# Patient Record
Sex: FEMALE | Race: BLACK OR AFRICAN AMERICAN | Employment: FULL TIME | URBAN - METROPOLITAN AREA
[De-identification: names, ages, dates, MRNs, and addresses within clinical notes are randomized per-mention and may not be internally consistent; named-entity substitution may affect disease eponyms.]

---

## 2020-02-25 ENCOUNTER — HOSPITAL ENCOUNTER (EMERGENCY)
Age: 30
Discharge: HOME | End: 2020-02-25
Payer: COMMERCIAL

## 2020-02-25 ENCOUNTER — APPOINTMENT (OUTPATIENT)
Dept: GENERAL RADIOLOGY | Age: 30
End: 2020-02-25
Payer: COMMERCIAL

## 2020-02-25 LAB
BACTERIA: ABNORMAL /HPF
BILIRUBIN URINE: ABNORMAL
BLOOD, URINE: ABNORMAL
CLARITY: CLEAR
COLOR: YELLOW
EPITHELIAL CELLS, UA: ABNORMAL /HPF (ref 0–5)
GLUCOSE URINE: NEGATIVE MG/DL
HCG(URINE) PREGNANCY TEST: NEGATIVE
KETONES, URINE: 40 MG/DL
LEUKOCYTE ESTERASE, URINE: ABNORMAL
MICROSCOPIC EXAMINATION: YES
NITRITE, URINE: NEGATIVE
PH UA: 8 (ref 5–8)
PROTEIN UA: 30 MG/DL
RAPID INFLUENZA  B AGN: NEGATIVE
RAPID INFLUENZA A AGN: NEGATIVE
RBC UA: ABNORMAL /HPF (ref 0–4)
S PYO AG THROAT QL: NEGATIVE
SPECIFIC GRAVITY UA: 1.02 (ref 1–1.03)
URINE REFLEX TO CULTURE: YES
URINE TYPE: ABNORMAL
UROBILINOGEN, URINE: 2 E.U./DL
WBC UA: ABNORMAL /HPF (ref 0–5)

## 2020-02-25 PROCEDURE — 81001 URINALYSIS AUTO W/SCOPE: CPT

## 2020-02-25 PROCEDURE — 87086 URINE CULTURE/COLONY COUNT: CPT

## 2020-02-25 PROCEDURE — 87804 INFLUENZA ASSAY W/OPTIC: CPT

## 2020-02-25 PROCEDURE — 84703 CHORIONIC GONADOTROPIN ASSAY: CPT

## 2020-02-25 PROCEDURE — 71046 X-RAY EXAM CHEST 2 VIEWS: CPT

## 2020-02-25 PROCEDURE — 87077 CULTURE AEROBIC IDENTIFY: CPT

## 2020-02-25 PROCEDURE — 6370000000 HC RX 637 (ALT 250 FOR IP): Performed by: NURSE PRACTITIONER

## 2020-02-25 PROCEDURE — 87081 CULTURE SCREEN ONLY: CPT

## 2020-02-25 PROCEDURE — 99283 EMERGENCY DEPT VISIT LOW MDM: CPT

## 2020-02-25 PROCEDURE — 87880 STREP A ASSAY W/OPTIC: CPT

## 2020-02-25 RX ORDER — ACETAMINOPHEN 500 MG
1000 TABLET ORAL 4 TIMES DAILY PRN
Qty: 60 TABLET | Refills: 0 | Status: SHIPPED | OUTPATIENT
Start: 2020-02-25

## 2020-02-25 RX ORDER — ONDANSETRON 4 MG/1
4 TABLET, ORALLY DISINTEGRATING ORAL ONCE
Status: DISCONTINUED | OUTPATIENT
Start: 2020-02-25 | End: 2020-02-25 | Stop reason: HOSPADM

## 2020-02-25 RX ORDER — CEFUROXIME AXETIL 250 MG/1
500 TABLET ORAL ONCE
Status: COMPLETED | OUTPATIENT
Start: 2020-02-25 | End: 2020-02-25

## 2020-02-25 RX ORDER — BENZONATATE 100 MG/1
200 CAPSULE ORAL ONCE
Status: COMPLETED | OUTPATIENT
Start: 2020-02-25 | End: 2020-02-25

## 2020-02-25 RX ORDER — IBUPROFEN 400 MG/1
800 TABLET ORAL ONCE
Status: COMPLETED | OUTPATIENT
Start: 2020-02-25 | End: 2020-02-25

## 2020-02-25 RX ORDER — GUAIFENESIN 600 MG/1
600 TABLET, EXTENDED RELEASE ORAL 2 TIMES DAILY
Status: DISCONTINUED | OUTPATIENT
Start: 2020-02-25 | End: 2020-02-25 | Stop reason: HOSPADM

## 2020-02-25 RX ORDER — ONDANSETRON 4 MG/1
4-8 TABLET, ORALLY DISINTEGRATING ORAL EVERY 12 HOURS PRN
Qty: 12 TABLET | Refills: 0 | Status: SHIPPED | OUTPATIENT
Start: 2020-02-25

## 2020-02-25 RX ORDER — NAPROXEN 500 MG/1
500 TABLET ORAL 2 TIMES DAILY
Qty: 30 TABLET | Refills: 0 | Status: SHIPPED | OUTPATIENT
Start: 2020-02-25

## 2020-02-25 RX ORDER — ACETAMINOPHEN 500 MG
1000 TABLET ORAL ONCE
Status: COMPLETED | OUTPATIENT
Start: 2020-02-25 | End: 2020-02-25

## 2020-02-25 RX ORDER — CEFUROXIME AXETIL 250 MG/1
250 TABLET ORAL 2 TIMES DAILY
Qty: 14 TABLET | Refills: 0 | Status: SHIPPED | OUTPATIENT
Start: 2020-02-25 | End: 2020-03-03

## 2020-02-25 RX ORDER — OSELTAMIVIR PHOSPHATE 75 MG/1
75 CAPSULE ORAL 2 TIMES DAILY
Qty: 10 CAPSULE | Refills: 0 | Status: SHIPPED | OUTPATIENT
Start: 2020-02-25 | End: 2020-03-01

## 2020-02-25 RX ORDER — GUAIFENESIN/DEXTROMETHORPHAN 100-10MG/5
5-10 SYRUP ORAL 3 TIMES DAILY PRN
Qty: 120 ML | Refills: 0 | Status: SHIPPED | OUTPATIENT
Start: 2020-02-25 | End: 2020-03-06

## 2020-02-25 RX ADMIN — ACETAMINOPHEN 1000 MG: 500 TABLET ORAL at 19:11

## 2020-02-25 RX ADMIN — BENZONATATE 200 MG: 100 CAPSULE ORAL at 19:11

## 2020-02-25 RX ADMIN — GUAIFENESIN 600 MG: 600 TABLET ORAL at 19:11

## 2020-02-25 RX ADMIN — IBUPROFEN 800 MG: 400 TABLET ORAL at 19:11

## 2020-02-25 RX ADMIN — CEFUROXIME AXETIL 500 MG: 250 TABLET ORAL at 21:14

## 2020-02-25 ASSESSMENT — PAIN SCALES - GENERAL
PAINLEVEL_OUTOF10: 8
PAINLEVEL_OUTOF10: 8

## 2020-02-25 ASSESSMENT — PAIN DESCRIPTION - LOCATION: LOCATION: GENERALIZED

## 2020-02-25 ASSESSMENT — PAIN DESCRIPTION - DESCRIPTORS: DESCRIPTORS: ACHING

## 2020-02-25 ASSESSMENT — PAIN DESCRIPTION - PAIN TYPE: TYPE: ACUTE PAIN

## 2020-02-26 VITALS
DIASTOLIC BLOOD PRESSURE: 85 MMHG | RESPIRATION RATE: 22 BRPM | SYSTOLIC BLOOD PRESSURE: 124 MMHG | HEIGHT: 63 IN | OXYGEN SATURATION: 100 % | TEMPERATURE: 98.6 F | WEIGHT: 153.88 LBS | BODY MASS INDEX: 27.27 KG/M2 | HEART RATE: 99 BPM

## 2020-02-26 LAB — S PYO THROAT QL CULT: NORMAL

## 2020-02-26 NOTE — ED NOTES
VS taken at approx 2000 and not documented.   /86, resp 22,  pulse 110,O2 sat 100% (approximately)      Steven Parker RN  02/26/20 1509

## 2020-02-26 NOTE — ED NOTES
[id number] ready to go home. Discharge instructions with pt. Explained rx's. UTI and influenza teaching with pt.    Encouraged follow up with her PCP  Body aches at 3-4     Luis Craft RN  02/26/20 4168

## 2020-02-26 NOTE — ED NOTES
NP already to bedside to see pt. Up to bathroom for urine specimen.         Carlos Mckeon RN  02/25/20 1906

## 2020-02-26 NOTE — ED NOTES
Explained new orders. Recheck temp 102.8.   No tylenol/motrin since early this am     Keyon Cruz, RN  02/25/20 5753

## 2020-02-26 NOTE — ED PROVIDER NOTES
[address] [address] [address] [facility] [address]  Dept: [phone]  Loc: [address] ENCOUNTER        This patient was not seen or evaluated by the attending physician. I evaluated this patient, the attending physician was available for consultation. CHIEF COMPLAINT    Chief Complaint   Patient presents with    Chest Congestion     starting 2 days ago    Fever     102.4F today    Generalized Body Aches     8/10        HPI    Andi Mckay is a 34 y.o. female who presents with nasal congestion associated with cough and generalized myalgia. Onset was 2 days ago. The duration has been constant since the onset. There are no alleviating factors. The severity of the myalgia pain is 8/10. She denies any abdominal pain nausea vomiting or diarrhea. States that she does have a mild cough that is nonproductive. She has been taking over-the-counter APAP and NSAIDs for fevers and chills. Measured fever at home 102.4 °F.  Denies being diabetic or immunocompromise. States that she came to the ED for further evaluation and treatment. REVIEW OF SYSTEMS    Pulmonary: no sore throat, see HPI, no hemoptysis  General: + fever, + myalgia  GI: no nausea, no vomiting  All other systems reviewed and are negative. PAST MEDICAL AND SURGICAL HISTORY    Past Medical History:   Diagnosis Date    H/O: psoriasis      No past surgical history on file.     CURRENT MEDICATIONS  (may include discharge medications prescribed in the ED)  Current Outpatient Rx   Medication Sig Dispense Refill    acetaminophen (TYLENOL) 500 MG tablet Take 2 tablets by mouth 4 times daily as needed for Pain or Fever 60 tablet 0    naproxen (NAPROSYN) 500 MG tablet Take 1 tablet by mouth 2 times daily 30 tablet 0    cefUROXime (CEFTIN) 250 MG tablet Take 1 tablet by mouth 2 times daily for 7 days 14 tablet 0    ondansetron (ZOFRAN ODT) 4 MG disintegrating on file       PHYSICAL EXAM    VITAL SIGNS: /85   Pulse 99   Temp 99.5 °F (37.5 °C) (Oral)   Resp 22   Ht 5' 2.5\" (1.588 m)   Wt 153 lb 14.1 oz (69.8 kg)   SpO2 100%   BMI 27.70 kg/m²   Constitutional:  Well developed, well nourished, no acute distress  Eyes: Sclera nonicteric, conjunctiva normal   Ears: The bilateral ear canal appears not erythematous or edematous and the TM appears gray and pearlescent nonbulging, the mastoid and pinna are without redness or swelling   Throat/Face:  no exudate or edema of the throat, no trismus  Neck: Supple, no enlarged tonsillar lymphadenopathy  Respiratory:  Lungs clear to auscultation bilaterally, no retractions  Cardiovascular:  tachycardic rate, regular rhythm  GI: soft, nontender, nondistended, no CVA tenderness  Neurologic: Awake, alert and oriented, no slurred speech  Integument: Skin is warm and dry, no rash    RADIOLOGY/PROCEDURES    XR CHEST STANDARD (2 VW)   Final Result   No acute pulmonary disease.            LABS  Results for orders placed or performed during the hospital encounter of 02/25/20   Strep Screen Group A Throat   Result Value Ref Range    Rapid Strep A Screen Negative Negative   Rapid influenza A/B antigens   Result Value Ref Range    Rapid Influenza A Ag Negative Negative    Rapid Influenza B Ag Negative Negative   Pregnancy, Urine   Result Value Ref Range    HCG(Urine) Pregnancy Test Negative Detects HCG level >20 MIU/mL   Urinalysis Reflex to Culture   Result Value Ref Range    Color, UA Yellow Straw/Yellow    Clarity, UA Clear Clear    Glucose, Ur Negative Negative mg/dL    Bilirubin Urine SMALL (A) Negative    Ketones, Urine 40 (A) Negative mg/dL    Specific Gravity, UA 1.020 1.005 - 1.030    Blood, Urine TRACE-INTACT (A) Negative    pH, UA 8.0 5.0 - 8.0    Protein, UA 30 (A) Negative mg/dL    Urobilinogen, Urine 2.0 (A) <2.0 E.U./dL    Nitrite, Urine Negative Negative    Leukocyte Esterase, Urine TRACE (A) Negative    Microscopic followup, and discharge instructions (see EMR)      (Please note that this note was completed with a voice recognition program.  Every attempt was made to edit the dictations, but inevitably there remain words that are mis-transcribed.)              BEULAH Claire CNP  02/25/20 2031

## 2020-02-27 LAB
ORGANISM: ABNORMAL
URINE CULTURE, ROUTINE: ABNORMAL

## 2021-04-16 ENCOUNTER — HOSPITAL ENCOUNTER (EMERGENCY)
Age: 31
Discharge: HOME | End: 2021-04-16
Attending: EMERGENCY MEDICINE

## 2021-04-16 VITALS
TEMPERATURE: 98.6 F | HEART RATE: 92 BPM | DIASTOLIC BLOOD PRESSURE: 97 MMHG | HEIGHT: 62 IN | OXYGEN SATURATION: 97 % | RESPIRATION RATE: 18 BRPM | SYSTOLIC BLOOD PRESSURE: 145 MMHG | BODY MASS INDEX: 28.15 KG/M2

## 2021-04-16 DIAGNOSIS — Z71.1 CONCERN ABOUT DISEASE WITHOUT DIAGNOSIS: Primary | ICD-10-CM

## 2021-04-16 LAB
BACTERIA: ABNORMAL /HPF
BILIRUBIN URINE: NEGATIVE
BLOOD, URINE: ABNORMAL
CLARITY: CLEAR
COLOR: YELLOW
EPITHELIAL CELLS, UA: ABNORMAL /HPF (ref 0–5)
GLUCOSE URINE: NEGATIVE MG/DL
KETONES, URINE: NEGATIVE MG/DL
LEUKOCYTE ESTERASE, URINE: ABNORMAL
MICROSCOPIC EXAMINATION: YES
NITRITE, URINE: NEGATIVE
PH UA: 6.5 (ref 5–8)
PROTEIN UA: NEGATIVE MG/DL
RBC UA: ABNORMAL /HPF (ref 0–4)
SPECIFIC GRAVITY UA: 1.02 (ref 1–1.03)
URINE REFLEX TO CULTURE: ABNORMAL
URINE TYPE: ABNORMAL
UROBILINOGEN, URINE: 0.2 E.U./DL
WBC UA: ABNORMAL /HPF (ref 0–5)

## 2021-04-16 PROCEDURE — 99283 EMERGENCY DEPT VISIT LOW MDM: CPT

## 2021-04-16 PROCEDURE — 81001 URINALYSIS AUTO W/SCOPE: CPT

## 2021-04-16 ASSESSMENT — ENCOUNTER SYMPTOMS
EYES NEGATIVE: 1
RESPIRATORY NEGATIVE: 1
GASTROINTESTINAL NEGATIVE: 1

## 2021-04-17 NOTE — ED NOTES
Patient ambulatory from ED. AVS provided and discussed with patient. All questions answered. Patient verbalizes understanding of discharge instructions. Respirations even and easy. No obvious distress at this time.        Eliane Tamayo RN  04/16/21 2858

## 2021-04-17 NOTE — ED PROVIDER NOTES
[address] [facility] ENCOUNTER        Pt Name: Lorene Buckley  MRN: [id number]  [place] 1990  Date of evaluation: 4/16/2021  Provider: Yulia Champion MD  PCP: Dong Melendez MD            [address] [place] [address]       Chief Complaint   Patient presents with    Abnormal Lab     Patient states she recieved a call that her pH levels were high on a recent UDS and was told she may have a UTI. Patient denies symptoms at this time. HISTORY OF PRESENT ILLNESS   (Location/Symptom, Timing/Onset, Context/Setting, Quality, Duration, Modifying Factors, Severity)  Note limiting factors. Lorene Buckley is a 27 y.o. female who comes in with a complaint that she had abnormal lab results. She took a urine drug screen for a job and they told her that the pH was too high so she came here to get further results. She is completely asymptomatic. No exacerbating or relieving factors no other associated signs or symptoms    Nursing Notes were all reviewed and agreed with or any disagreements were addressed  in the HPI. REVIEW OF SYSTEMS    (2-9 systems for level 4, 10 or more for level 5)     Review of Systems   Constitutional: Negative. HENT: Negative. Eyes: Negative. Respiratory: Negative. Cardiovascular: Negative. Gastrointestinal: Negative. Endocrine: Negative. Genitourinary: Negative. Musculoskeletal: Negative. Skin: Negative. Neurological: Negative. Hematological: Negative. Psychiatric/Behavioral: Negative. PAST MEDICAL HISTORY     Past Medical History:   Diagnosis Date    H/O: psoriasis        SURGICAL HISTORY   History reviewed. No pertinent surgical history.     CURRENTMEDICATIONS       Previous Medications    ACETAMINOPHEN (TYLENOL) 500 MG TABLET    Take 2 tablets by mouth 4 times daily as needed for Pain or Fever    NAPROXEN (NAPROSYN) 500 MG TABLET    Take 1 tablet by mouth 2 times daily    ONDANSETRON [facility] ODT) 4 MG DISINTEGRATING TABLET    Take 1-2 tablets by mouth every 12 hours as needed for Nausea May Sub regular tablet (non-ODT) if insurance does not cover ODT. ALLERGIES     Patient has no known allergies. FAMILYHISTORY     History reviewed. No pertinent family history. SOCIAL HISTORY       Social History     Socioeconomic History    Marital status: Single     Spouse name: None    Number of children: None    Years of education: None    Highest education level: None   Occupational History    None   Social Needs    Financial resource strain: None    Food insecurity     Worry: None     Inability: None    Transportation needs     Medical: None     Non-medical: None   Tobacco Use    Smoking status: Never Smoker    Smokeless tobacco: Never Used   Substance and Sexual Activity    Alcohol use: No    Drug use: No    Sexual activity: Yes     Partners: Male   Lifestyle    Physical activity     Days per week: None     Minutes per session: None    Stress: None   Relationships    Social connections     Talks on phone: None     Gets together: None     Attends Ca[employer] service: None     Active member of club or organization: None     Attends meetings of clubs or organizations: None     Relationship status: None    Intimate partner violence     Fear of current or ex partner: None     Emotionally abused: None     Physically abused: None     Forced sexual activity: None   Other Topics Concern    None   Social History Narrative    None       SCREENINGS             PHYSICAL EXAM    (up to 7 for level 4, 8 or more for level 5)     ED Triage Vitals   BP Temp Temp Source Pulse Resp SpO2 Height Weight   04/16/21 2014 04/16/21 2015 04/16/21 2015 04/16/21 2012 04/16/21 2012 04/16/21 2012 04/16/21 2012 --   (!) 146/100 98.6 °F (37 °C) Oral 94 18 100 % 5' 2\" (1.575 m)        Physical Exam  Vitals signs and nursing note reviewed. Constitutional:       Appearance: Normal appearance.    HENT:      Head: found.      PROCEDURES   Unless otherwise noted below, none     Procedures    CRITICAL CARE TIME   N/A    CONSULTS:  None      EMERGENCY DEPARTMENT COURSE and DIFFERENTIAL DIAGNOSIS/MDM:   Vitals:    Vitals:    04/16/21 2012 04/16/21 2014 04/16/21 2015   BP:  (!) 146/100    Pulse: 94     Resp: 18     Temp:   98.6 °F (37 °C)   TempSrc:   Oral   SpO2: 100%     Height: 5' 2\" (1.575 m)         Patient was given thefollowing medications:  Medications - No data to display        This patient is completely asymptomatic. Her urinalysis is normal including the pH. She has no infectious symptoms. Perhaps there was lab error that sent her here, but she is fine and will be discharged home to follow-up with primary care     FINAL IMPRESSION      1.  Concern about disease without diagnosis          DISPOSITION/PLAN   DISPOSITION Decision To Discharge 04/16/2021 09:26:16 PM      PATIENT REFERREDTO:  Gordy Campbell MD  [address] [place] [address]  [phone]    Call in 1 day        DISCHARGE MEDICATIONS:  New Prescriptions    No medications on file       DISCONTINUED MEDICATIONS:  Discontinued Medications    No medications on file              (Please note that portions ofthis note were completed with a voice recognition program.  Efforts were made to edit the dictations but occasionally words are mis-transcribed.)    Zachary Thompson MD (electronically signed)              Zachary Thompson MD  04/16/21 6161